# Patient Record
Sex: MALE | Race: WHITE | Employment: FULL TIME | URBAN - METROPOLITAN AREA
[De-identification: names, ages, dates, MRNs, and addresses within clinical notes are randomized per-mention and may not be internally consistent; named-entity substitution may affect disease eponyms.]

---

## 2018-10-02 ENCOUNTER — OFFICE VISIT (OUTPATIENT)
Dept: ORTHOPEDIC SURGERY | Age: 53
End: 2018-10-02

## 2018-10-02 VITALS
DIASTOLIC BLOOD PRESSURE: 83 MMHG | RESPIRATION RATE: 16 BRPM | WEIGHT: 184.4 LBS | OXYGEN SATURATION: 95 % | HEART RATE: 66 BPM | TEMPERATURE: 95.7 F | BODY MASS INDEX: 27.31 KG/M2 | HEIGHT: 69 IN | SYSTOLIC BLOOD PRESSURE: 114 MMHG

## 2018-10-02 DIAGNOSIS — S92.324A CLOSED NONDISPLACED FRACTURE OF SECOND METATARSAL BONE OF RIGHT FOOT, INITIAL ENCOUNTER: ICD-10-CM

## 2018-10-02 DIAGNOSIS — S92.334A CLOSED NONDISPLACED FRACTURE OF THIRD METATARSAL BONE OF RIGHT FOOT, INITIAL ENCOUNTER: ICD-10-CM

## 2018-10-02 DIAGNOSIS — M25.571 ACUTE RIGHT ANKLE PAIN: ICD-10-CM

## 2018-10-02 DIAGNOSIS — M79.671 RIGHT FOOT PAIN: Primary | ICD-10-CM

## 2018-10-02 RX ORDER — IBUPROFEN 800 MG/1
TABLET ORAL
COMMUNITY

## 2018-10-02 RX ORDER — LOSARTAN POTASSIUM AND HYDROCHLOROTHIAZIDE 25; 100 MG/1; MG/1
1 TABLET ORAL DAILY
COMMUNITY

## 2018-10-02 NOTE — MR AVS SNAPSHOT
[address] [place] [address], [address] 200 [facility] 
[phone] Patient: Abraham Rosario MRN: [id number] :1965 Visit Information Date & Time Provider Department Dept. Phone Encounter #  
 10/2/2018  2:00 PM Kasandra Molina, 27 [facility] [phone] [id number] Upcoming Health Maintenance Date Due Pneumococcal 19-64 Medium Risk (1 of 1 - PPSV23) 1986 DTaP/Tdap/Td series (1 - Tdap) 1988 Shingrix Vaccine Age 50> (1 of 2) 2017 FOBT Q 1 YEAR AGE 50-75 2017 Influenza Age 5 to Adult 2018 Allergies as of 10/2/2018  Review Complete On: 10/2/2018 By: Rudy Noel No Known Allergies Current Immunizations  Never Reviewed No immunizations on file. Not reviewed this visit You Were Diagnosed With   
  
 Codes Comments Right foot pain    -  Primary ICD-10-CM: L40.888 ICD-9-CM: 729.5 Acute right ankle pain     ICD-10-CM: M25.571 ICD-9-CM: 719.47, 338.19 Vitals BP Pulse Temp Resp Height(growth percentile) Weight(growth percentile) 114/83 66 95.7 °F (35.4 °C) (Oral) 16 5' 9\" (1.753 m) 184 lb 6.4 oz (83.6 kg) SpO2 BMI Smoking Status 95% 27.23 kg/m2 Current Every Day Smoker Vitals History BMI and BSA Data Body Mass Index Body Surface Area  
 27.23 kg/m 2 2.02 m 2 Your Updated Medication List  
  
   
This list is accurate as of 10/2/18  2:37 PM.  Always use your most recent med list.  
  
  
  
  
 cyclobenzaprine 10 mg tablet Commonly known as:  FLEXERIL Take 10 mg by mouth three (3) times daily as needed for Muscle Spasm(s). ibuprofen 800 mg tablet Commonly known as:  MOTRIN Take  by mouth.  
  
 losartan-hydroCHLOROthiazide 100-25 mg per tablet Commonly known as:  HYZAAR Take 1 Tab by mouth daily. VICODIN PO Take  by mouth daily. We Performed the Following AMB POC XRAY, FOOT; COMPLETE, 3+ VIEW [69081 CPT(R)] POC XRAY, ANKLE; 2 VIEWS [37498 CPT(R)] Patient Instructions Please follow up after CT. You are advised to contact us if your condition worsens. An MRI or CT has been ordered for you. A [employer] will be contacting you to schedule the appointment as soon as it has been approved with your insurance company. Please schedule an appointment to follow up with the doctor or the physicians assistant after the MRI or CT has been conducted. Metatarsal Fracture: Care Instructions Your Care Instructions A metatarsal fracture is a break or a thin, hairline crack in one of the metatarsal bones of the foot. This type of fracture usually happens from repeated stress on the bones of the foot. Or it can happen when a person jumps or changes direction quickly and twists his or her foot or ankle the wrong way. 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 Treatment depends on how bad the fracture is and where the fracture is on the bone. You may or may not have had surgery. Your doctor may have put your foot in a cast or splint to keep it stable. You may have been given crutches to use to keep weight off your foot. A metatarsal fracture may take from 6 weeks to several months to heal. It is important to give your foot time to heal completely, so that you do not hurt it again. Do not return to your usual activities until your doctor says you can. Your doctor may suggest that you get physical therapy to help regain strength and range of motion in your foot. You heal best when you take good care of yourself. Eat a variety of healthy foods, and don't smoke. Follow-up care is a key part of your treatment and safety.  Be sure to make and go to all appointments, and call your doctor if you are having problems. It is also a good idea to know your test results and keep a list of the medicines you take. How can you care for yourself at home? · Be safe with medicines. Read and follow all instructions on the label. ¨ If your doctor gave you a prescription medicine for pain, take it as prescribed. ¨ If you are not taking a prescription pain medicine, ask your doctor if you can take an over-the-counter medicine. · Follow your doctor's instructions about how much weight you can put on your foot and when you can go back to your usual activities. If you were given crutches, use them as directed. · Put ice or a cold pack on your foot for 10 to 20 minutes at a time. Try to do this every 1 to 2 hours for the next 3 days (when you are awake) or until the swelling goes down. Put a thin cloth between the ice and your skin. · Prop up your foot on a pillow when you ice it or anytime you sit or lie down for the next 3 days. Try to keep it above the level of your heart. This will help reduce swelling. Cast and splint care · If your foot is in a cast or splint, follow the cast or splint care instructions your doctor gives you. If you have a removable fiberglass walking cast or a splint, do not take it off unless your doctor tells you to. · Keep your cast or splint dry. If you have a removable fiberglass walking cast or a splint, ask your doctor if it is okay to remove it to bathe. Your doctor may want you to keep it on as much as possible. · If you're told to keep your cast or splint on, tape a sheet of plastic to cover it when you bathe. Or ask your doctor about products that can help keep a cast or splint dry. Water under the cast or splint can cause your skin to itch and hurt. · Never cut your cast or stick anything down it to scratch an itch on your leg. When should you call for help? Call your doctor now or seek immediate medical care if: 
  · You have problems with your cast or splint. For example: ¨ The skin under the cast or splint is burning or stinging. ¨ The cast or splint feels too tight. ¨ There is a lot of swelling near the cast or splint. (Some swelling is normal.) ¨ You have a new fever. ¨ There is drainage or a bad smell coming from the cast or splint.  
  · You have increased or severe pain.  
  · You have tingling, weakness, or numbness in your foot and toes.  
  · You cannot move your toes.  
  · Your foot turns cold or changes color.  
 Watch closely for changes in your health, and be sure to contact your doctor if: 
  · The pain does not get better day by day.  
  · You do not get better as expected. Where can you learn more? Go to http://rafi-chelsy.info/. Enter ([phone] in the search box to learn more about \"Metatarsal Fracture: Care Instructions. \" Current as of: November 29, 2017 Content Version: 11.7 © 6749-0003 [employer]ash. Care instructions adapted under license by [employer] (which disclaims liability or warranty for this information). If you have questions about a medical condition or this instruction, always ask your healthcare professional. [place] [address] any warranty or liability for your use of this information. Introducing \[facility] [place] [facility]\"" & HEALTH SERVICES! [facility] introduces [email or web address] patient portal. Now you can access parts of your medical record, email your doctor's office, and request medication refills online. 1. In your internet browser, go to [email or web address]. [employer] 2. Click on the First Time User? Click Here link in the Sign In box. You will see the New Member Sign Up page. 3. Enter your [email or web address] Access Code exactly as it appears below. You will not need to use this code after youve completed the sign-up process. If you do not sign up before the expiration date, you must request a new code. · [email or web address] Access Code: [email or web address] Expires: 12/31/2018  2:37 PM 
 
 4. Enter the last four digits of your Social Security Number (xxxx) and Date of Birth (mm/dd/yyyy) as indicated and click Submit. You will be taken to the next sign-up page. 5. Create a [employer] ID. This will be your [employer] login ID and cannot be changed, so think of one that is secure and easy to remember. 6. Create a [employer] password. You can change your password at any time. 7. Enter your Password Reset Question and Answer. This can be used at a later time if you forget your password. 8. Enter your e-mail address. You will receive e-mail notification when new information is available in [address] 9. Click Sign Up. You can now view and download portions of your medical record. 10. Click the Download Summary menu link to download a portable copy of your medical information. If you have questions, please visit the Frequently Asked Questions section of the [employer] website. Remember, [employer] is NOT to be used for urgent needs. For medical emergencies, dial 911. Now available from your iPhone and Android! Please provide this summary of care documentation to your next provider. Your primary care clinician is listed as NONE. If you have any questions after today's visit, please call [phone].
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
## 2018-10-02 NOTE — PATIENT INSTRUCTIONS
Please follow up after CT. You are advised to contact us if your condition worsens. An MRI or CT has been ordered for you. A [facility] will be contacting you to schedule the appointment as soon as it has been approved with your insurance company. Please schedule an appointment to follow up with the doctor or the physicians assistant after the MRI or CT has been conducted. Metatarsal Fracture: Care Instructions Your Care Instructions A metatarsal fracture is a break or a thin, hairline crack in one of the metatarsal bones of the foot. This type of fracture usually happens from repeated stress on the bones of the foot. Or it can happen when a person jumps or changes direction quickly and twists his or her foot or ankle the wrong way. 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 Treatment depends on how bad the fracture is and where the fracture is on the bone. You may or may not have had surgery. Your doctor may have put your foot in a cast or splint to keep it stable. You may have been given crutches to use to keep weight off your foot. A metatarsal fracture may take from 6 weeks to several months to heal. It is important to give your foot time to heal completely, so that you do not hurt it again. Do not return to your usual activities until your doctor says you can. Your doctor may suggest that you get physical therapy to help regain strength and range of motion in your foot. You heal best when you take good care of yourself. Eat a variety of healthy foods, and don't smoke. Follow-up care is a key part of your treatment and safety. Be sure to make and go to all appointments, and call your doctor if you are having problems. It is also a good idea to know your test results and keep a list of the medicines you take. How can you care for yourself at home? · Be safe with medicines. Read and follow all instructions on the label. ¨ If your doctor gave you a prescription medicine for pain, take it as prescribed. ¨ If you are not taking a prescription pain medicine, ask your doctor if you can take an over-the-counter medicine. · Follow your doctor's instructions about how much weight you can put on your foot and when you can go back to your usual activities. If you were given crutches, use them as directed. · Put ice or a cold pack on your foot for 10 to 20 minutes at a time. Try to do this every 1 to 2 hours for the next 3 days (when you are awake) or until the swelling goes down. Put a thin cloth between the ice and your skin. · Prop up your foot on a pillow when you ice it or anytime you sit or lie down for the next 3 days. Try to keep it above the level of your heart. This will help reduce swelling. Cast and splint care · If your foot is in a cast or splint, follow the cast or splint care instructions your doctor gives you. If you have a removable fiberglass walking cast or a splint, do not take it off unless your doctor tells you to. · Keep your cast or splint dry. If you have a removable fiberglass walking cast or a splint, ask your doctor if it is okay to remove it to bathe. Your doctor may want you to keep it on as much as possible. · If you're told to keep your cast or splint on, tape a sheet of plastic to cover it when you bathe. Or ask your doctor about products that can help keep a cast or splint dry. Water under the cast or splint can cause your skin to itch and hurt. · Never cut your cast or stick anything down it to scratch an itch on your leg. When should you call for help? Call your doctor now or seek immediate medical care if: 
  · You have problems with your cast or splint. For example: ¨ The skin under the cast or splint is burning or stinging. ¨ The cast or splint feels too tight. ¨ There is a lot of swelling near the cast or splint. (Some swelling is normal.) ¨ You have a new fever. ¨ There is drainage or a bad smell coming from the cast or splint.  
  · You have increased or severe pain.  
  · You have tingling, weakness, or numbness in your foot and toes.  
  · You cannot move your toes.  
  · Your foot turns cold or changes color.  
 Watch closely for changes in your health, and be sure to contact your doctor if: 
  · The pain does not get better day by day.  
  · You do not get better as expected. Where can you learn more? Go to http://rafi-chelsy.info/. Enter ([phone] in the search box to learn more about \"Metatarsal Fracture: Care Instructions. \" Current as of: November 29, 2017 Content Version: 11.7 © 4374-4432 [employer]. Care instructions adapted under license by [employer] (which disclaims liability or warranty for this information). If you have questions about a medical condition or this instruction, always ask your healthcare professional. [address] any warranty or liability for your use of this information.

## 2018-10-02 NOTE — PROCEDURES
RADIOGRAPHIC INTERPRETATION    The impression for this/these radiograph(s) will be found in the office visit progress note for this encounter from 10/2/2018.     César Diaz MD  10/2/2018  2:19 PM

## 2018-10-12 ENCOUNTER — HOSPITAL ENCOUNTER (OUTPATIENT)
Dept: CT IMAGING | Age: 53
Discharge: HOME | End: 2018-10-12
Attending: ORTHOPAEDIC SURGERY
Payer: COMMERCIAL

## 2018-10-12 DIAGNOSIS — M25.571 ACUTE RIGHT ANKLE PAIN: ICD-10-CM

## 2018-10-12 DIAGNOSIS — M79.671 RIGHT FOOT PAIN: ICD-10-CM

## 2018-10-12 PROCEDURE — 73700 CT LOWER EXTREMITY W/O DYE: CPT

## 2018-10-29 ENCOUNTER — OFFICE VISIT (OUTPATIENT)
Dept: ORTHOPEDIC SURGERY | Age: 53
End: 2018-10-29

## 2018-10-29 VITALS
DIASTOLIC BLOOD PRESSURE: 93 MMHG | HEART RATE: 94 BPM | WEIGHT: 183 LBS | RESPIRATION RATE: 16 BRPM | OXYGEN SATURATION: 97 % | TEMPERATURE: 98 F | BODY MASS INDEX: 27.11 KG/M2 | SYSTOLIC BLOOD PRESSURE: 136 MMHG | HEIGHT: 69 IN

## 2018-10-29 DIAGNOSIS — S92.344D CLOSED NONDISPLACED FRACTURE OF FOURTH METATARSAL BONE OF RIGHT FOOT WITH ROUTINE HEALING: ICD-10-CM

## 2018-10-29 DIAGNOSIS — S92.334D CLOSED NONDISPLACED FRACTURE OF THIRD METATARSAL BONE OF RIGHT FOOT WITH ROUTINE HEALING, SUBSEQUENT ENCOUNTER: Primary | ICD-10-CM

## 2018-10-29 DIAGNOSIS — S92.354D CLOSED NONDISPLACED FRACTURE OF FIFTH METATARSAL BONE OF RIGHT FOOT WITH ROUTINE HEALING: ICD-10-CM

## 2018-10-29 DIAGNOSIS — S92.324D CLOSED NONDISPLACED FRACTURE OF SECOND METATARSAL BONE OF RIGHT FOOT WITH ROUTINE HEALING, SUBSEQUENT ENCOUNTER: ICD-10-CM

## 2018-10-29 RX ORDER — ACETAMINOPHEN AND CODEINE PHOSPHATE 300; 30 MG/1; MG/1
2 TABLET ORAL
Qty: 30 TAB | Refills: 0 | Status: SHIPPED | OUTPATIENT
Start: 2018-10-29

## 2018-10-29 NOTE — LETTER
NOTIFICATION RETURN TO WORK / SCHOOL 
 
10/29/2018 9:56 AM 
 
Mr. Liana Le 85 [address] [facility] [address] To Whom It May Concern: 
 
Liana Rutledge is currently under the care of [facility] Travon Gifford Corby Sahni. Please allow Mr. Garcia Harley to use his discretion to remain seated as needed due to multiple metatarsal fractures of his right foot. Please make accomdations for him, including providing a chair and assistance from colleagues. If there are questions or concerns please have the patient contact our office.  
 
 
 
Sincerely, 
 
 
Eric Arnold MD

## 2018-10-29 NOTE — PROGRESS NOTES
AMBULATORY PROGRESS NOTE Patient: Leona Casarez             MRN: [id number]     SSN: [id number] Body mass index is 27.02 kg/m². YOB: 1965     AGE: 50 y.o. EX: male PCP: Elicia Leonardo NP IMPRESSION/DIAGNOSIS AND TREATMENT PLAN  
 
DIAGNOSES 1. Closed nondisplaced fracture of third metatarsal bone of right foot with routine healing, subsequent encounter 2. Closed nondisplaced fracture of second metatarsal bone of right foot with routine healing, subsequent encounter 3. Closed nondisplaced fracture of fourth metatarsal bone of right foot with routine healing 4. Closed nondisplaced fracture of fifth metatarsal bone of right foot with routine healing Orders Placed This Encounter  [65847] Foot Min 3V  acetaminophen-codeine (TYLENOL-CODEINE #3) 300-30 mg per tablet Leona Casarez understands his diagnoses and the proposed plan. As such, in this individual, he has additional metatarsal fractures of the third and of the fourth that are seen on the CT scan. The CT scan is listed as below. When I saw him, I knew he had a second and third, but we ordered a CT scan to give him a more complete picture. He had x-rays today in my office, three views, as he has been weightbearing on this foot. He discontinued the CAM walker boot because he states it is too uncomfortable, so he has been wearing tennis shoes. He does work at [employer]. He sits primarily, but he is still at work. So, the CT scan showed fractures to the second, third, fourth, and fifth metatarsals, non-displaced and non-angulated. X-rays of his foot today, nonweightbearing, show an extraarticular fracture to the second metatarsal base and third metatarsal base and a hairline fracture to the fourth metatarsal base. The fifth metatarsal fracture is hard to see on this plain x-ray, but it is present on the CT scan. The plan is listed as below. I want to reassess him in three weeks. X-rays upon next visit. He arrived here without his CAM walker boot. Plan: 
 
1) Work Note: Please allow Mr. Nhan Keller to use his discretion to remain seated as needed due to his multiple metatarsal fractures of his right foot. Please make accomdations for him, including providing a chair and assistance from colleagues. 2) Tylenol #3: 2 PO TIDPRN; 30 tablets, 0 refills. 3) Continue activity modification as directed. RTO - 3 weeks and PLEASE OBTAIN X-RAYS OF: left foot 3 VIEWS  
 HPI AND EXAMINATION Chi Whitney IS A 50 y.o. male who presents to my outpatient office for follow up of a closed nondisplaced fracture of the second and third metatarsal of the right foot. At last visit, I ordered a CT scan of the right foot, instructed the patient to continue using the CAM walker boot as directed, and to continue activity modification as directed. Date of injury: 9/22/2018. Since we saw him last, Mr. Nhan Keller states that he is still experiencing pain from his multiple metatarsal fractures. He reports that he was taking Vicodin as prescribed by his PCP, but that he is currently not taking any pain medication. He notes that the Vicodin did not help much, and states that his neighbor mentioned he should be taking Codeine. At this time, he will receive a prescription for Tylenol #3 and will continue activity modification as directed. 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 Appearance: Alert, well appearing and pleasant patient who is in no distress, oriented to person, place/time, and who follows commands. This patient is accompanied in the examination room by his  self. no dementia Psychiatric: Affect and mood are appropriate. Patient arrives to office via: without assistive device:  Cam boot (a friends)// no crutch 
H EENT (2): Head normocephalic & atraumatic. Both pupils are round, non icteric sclera Eye: EOM are intact and sclera are clear Neck: ROM WNL Hearings Intact, does not require hearing aid device Respiratory: Breathing is unlabored without accessory chest muscle use Cardiovascular/Peripheral Vascular: Normal Pulses to each  foot ANKLE/FOOT right 
  
Psychiatry: Alert, Oriented x 3; Speech normal in context and clarity,  
                    Memory intact grossly, no involuntary movements - tremors, no dementia Gait: Normal 
Tenderness: Mild tenderness to the posterior fibula along peroneal tendons Mild tenderness to the lateral ankle Moderate tenderness to the #2 metatarsal.  
Cutaneous: Mild to moderate swelling of the lateral ankle. Joint Motion: Not tested at this time due to pain. Joint / Tendon Stability: No Ankle or Subtalar instability or joint laxity. No peroneal sublux ability or dislocation Alignment: Forefoot, Midfoot, Hindfoot WNL. Neuro Motor/Sensory: NL/NL. Vascular: NL foot/ankle pulses. Lymphatics: No extremity lymphedema, No calf swelling, no tenderness to calf muscles. CHART REVIEW No past medical history on file. Current Outpatient Medications Medication Sig  
 acetaminophen-codeine (TYLENOL-CODEINE #3) 300-30 mg per tablet Take 2 Tabs by mouth three (3) times daily as needed for Pain. Max Daily Amount: 6 Tabs.  losartan-hydroCHLOROthiazide (HYZAAR) 100-25 mg per tablet Take 1 Tab by mouth daily.  cyclobenzaprine (FLEXERIL) 10 mg tablet Take 10 mg by mouth three (3) times daily as needed for Muscle Spasm(s).  ibuprofen (MOTRIN) 800 mg tablet Take  by mouth. No current facility-administered medications for this visit. No Known Allergies Past Surgical History:  
Procedure Laterality Date  HX ORTHOPAEDIC Social History Occupational History  Not on file Tobacco Use  
  Smoking status: Current Every Day Smoker Packs/day: 1.00 Substance and Sexual Activity  Alcohol use: Yes Comment: daily  Drug use: No  
 Sexual activity: Not on file No family history on file. REVIEW OF SYSTEMS : 10/29/2018  ALL BELOW ARE Negative except : SEE HPI Constitutional: Negative for fever, chills and weight loss. Neg Weight Loss Cardiovascular: Negative for chest pain, claudication and leg swelling. SOB, ANTHONY Gastrointestinal/Urological: Negative for  pain, N/V/D/C, Blood in stool or urine,dysuria                         Hematuria, Incontinence, pelvic pain Musculoskeletal: see HPI. Neurological: Negative for dizziness and weakness, headaches,Visual Changes             Confusion,  Or Seizures, Psychiatric/Behavioral: Negative for depression, memory loss and substance abuse. Extremities:  Negative for hair changes, rash or skin lesion changes. Hematologic: Negative for Bleeding problems, bruising, pallor or swollen lymph nodes. Peripheral Vascular: No calf pain, vascular vein tenderness to calf pain No calf throbbing, posterior knee throbbing pain DIAGNOSTIC IMAGING No notes on file CT Results (most recent): 
Results from Hospital Encounter encounter on 10/12/18 CT FOOT RT WO CONT Narrative CT of right lower extremity without contrast 
 
CPT CODE: 72655 HISTORY: Acute right ankle and foot pain. Metatarsal fracture evaluation. COMPARISON: None. TECHNIQUE: Contiguous thin axial images to the right foot are performed from the 
level of ankle to the level of the toes without IV contrast administration. Computer coronal and sagittal reconstruction images are performed for better 
evaluation of the bony structures in relation to the adjacent vasculature and 
soft tissue structures and reduce the radiation dose. 3D postprocessed images, 
including surface shaded display, also produce for this exam, permanently archived, and interpreted. All CT scans at this facility performed using dose optimization techniques as 
appreciated to a performed exam, to include automated exposure control, 
adjustment of the mA and or KU according to patient size (including appropriate 
matching for site specific examination), or use of iterative reconstruction 
technique. FINDINGS:  
 
There are subtle oblique/transverse lucent lines identified at the bases of the 
second through fourth metatarsals, suggesting nondisplaced fractures. There is 
also a oblique/vertical fracture line identified in the fifth metatarsal. The 
remaining bony structures in right foot appear intact. No joint space 
abnormality or dislocation identified. The imaged distal tibia and fibula appear 
intact. Mild edema of the foot noted. No significant ankle edema seen. Impression IMPRESSION: 
 
1. Subtle nondisplaced fractures across the bases of second through fourth 
metatarsals and nondisplaced oblique fracture of fifth metatarsal identified. 2. Mild foot edema. Thank you for your referral.   
 
Please see above section of this report. I have personally reviewed the results of the above study. The interpretation of this study is my professional opinion. Written by Halley Daily, as dictated by Dr. Zoya Hansen. I, Dr. Zoya Hansen, confirm that all documentation is accurate.

## 2018-10-29 NOTE — PATIENT INSTRUCTIONS
Broken Foot: Care Instructions Your Care Instructions A broken foot, or foot fracture, is a break in one or more of the bones in your foot. It may happen because of a sports injury, a fall, or other accident. A compound, or open, fracture occurs when a bone breaks through the skin. A break that does not poke through the skin is a closed fracture. Your treatment depends on the location and type of break in your foot. You may need a splint, a cast, or an orthopedic shoe. Certain kinds of injuries may need surgery at some time. Whatever your treatment, you can ease symptoms and help your foot heal with care at home. You may need 6 to 8 weeks or more to fully heal. 
You heal best when you take good care of yourself. Eat a variety of healthy foods, and don't smoke. Follow-up care is a key part of your treatment and safety. Be sure to make and go to all appointments, and call your doctor if you are having problems. It's also a good idea to know your test results and keep a list of the medicines you take. How can you care for yourself at home? · Be safe with medicines. Take pain medicines exactly as directed. ? If the doctor gave you a prescription medicine for pain, take it as prescribed. ? If you are not taking a prescription pain medicine, ask your doctor if you can take an over-the-counter medicine. · Leave the splint on until your follow-up appointment. Do not put any weight on the injured foot. If you were given crutches, use them as directed. · Put ice or a cold pack on your foot for 10 to 20 minutes at a time. Try to do this every 1 to 2 hours for the next 3 days (when you are awake) or until the swelling goes down. Put a thin cloth between the ice and your skin. · Prop up the sore foot on a pillow anytime you sit or lie down during the next 3 days. Try to keep it above the level of your heart. This will help reduce swelling. · Follow the cast care instructions your doctor gives you. If you have a splint, do not take it off unless your doctor tells you to. Cast and splint care · If you have a removable splint, ask your doctor if it is okay to remove it to bathe. Your doctor may want you to keep it on as much as possible. · Keep your plaster splint covered by taping a sheet of plastic around it when you bathe. Water under the plaster can cause your skin to itch and hurt. · Never cut your splint. When should you call for help? Call 911 anytime you think you may need emergency care. For example, call if: 
  · You have chest pain, are short of breath, or you cough up blood.  
 Call your doctor now or seek immediate medical care if: 
  · You have new or worse pain.  
  · Your foot is cool or pale or changes color.  
  · You have tingling, weakness, or numbness in your toes.  
  · Your cast or splint feels too tight.  
  · You have signs of a blood clot in your leg (called a deep vein thrombosis), such as: 
? Pain in your calf, back of the knee, thigh, or groin. ? Redness or swelling in your leg.  
 Watch closely for changes in your health, and be sure to contact your doctor if: 
  · You have a problem with your splint or cast.  
  · You do not get better as expected. Where can you learn more? Go to http://rafi-chelsy.info/. Enter L725 in the search box to learn more about \"Broken Foot: Care Instructions. \" Current as of: November 29, 2017 Content Version: 11.8 © 8869-7871 [employer]. Care instructions adapted under license by [employer] (which disclaims liability or warranty for this information). If you have questions about a medical condition or this instruction, always ask your healthcare professional. [address] any warranty or liability for your use of this information.

## 2018-11-19 ENCOUNTER — OFFICE VISIT (OUTPATIENT)
Dept: ORTHOPEDIC SURGERY | Age: 53
End: 2018-11-19

## 2018-11-19 VITALS
OXYGEN SATURATION: 99 % | HEIGHT: 69 IN | BODY MASS INDEX: 27.46 KG/M2 | WEIGHT: 185.4 LBS | TEMPERATURE: 97.8 F | SYSTOLIC BLOOD PRESSURE: 128 MMHG | HEART RATE: 75 BPM | RESPIRATION RATE: 16 BRPM | DIASTOLIC BLOOD PRESSURE: 92 MMHG

## 2018-11-19 DIAGNOSIS — S92.344D CLOSED NONDISPLACED FRACTURE OF FOURTH METATARSAL BONE OF RIGHT FOOT WITH ROUTINE HEALING: ICD-10-CM

## 2018-11-19 DIAGNOSIS — S92.354D CLOSED NONDISPLACED FRACTURE OF FIFTH METATARSAL BONE OF RIGHT FOOT WITH ROUTINE HEALING: ICD-10-CM

## 2018-11-19 DIAGNOSIS — M79.671 RIGHT FOOT PAIN: Primary | ICD-10-CM

## 2018-11-19 DIAGNOSIS — S92.334D CLOSED NONDISPLACED FRACTURE OF THIRD METATARSAL BONE OF RIGHT FOOT WITH ROUTINE HEALING, SUBSEQUENT ENCOUNTER: ICD-10-CM

## 2018-11-19 DIAGNOSIS — S92.324D CLOSED NONDISPLACED FRACTURE OF SECOND METATARSAL BONE OF RIGHT FOOT WITH ROUTINE HEALING, SUBSEQUENT ENCOUNTER: ICD-10-CM

## 2018-11-19 NOTE — LETTER
NOTIFICATION RETURN TO WORK / SCHOOL 
 
11/19/2018 9:38 AM 
 
Mr. Blair Le 85 [id number] [facility] [address] To Whom It May Concern: 
 
Blair Barrios is currently under the care of [address] [place] [address] Corby Sahni. Please allow Mr. Nguyen Barr to use his discretion to remain seated as needed due to his multiple metatarsal fractures of his right foot. Please make accomdations for him, including providing a chair and assistance from colleagues. If there are questions or concerns please have the patient contact our office.  
 
 
 
Sincerely, 
 
 
Jovan Dacosta MD

## 2018-11-19 NOTE — PROGRESS NOTES
AMBULATORY PROGRESS NOTE Patient: Leona Casarez             MRN: [id number]     SSN: [id number] Body mass index is 27.38 kg/m². YOB: 1965     AGE: 48 y.o. EX: male PCP: Elicia Leonardo NP IMPRESSION/DIAGNOSIS AND TREATMENT PLAN  
 
DIAGNOSES 1. Right foot pain 2. Closed nondisplaced fracture of third metatarsal bone of right foot with routine healing, subsequent encounter 3. Closed nondisplaced fracture of second metatarsal bone of right foot with routine healing, subsequent encounter 4. Closed nondisplaced fracture of fourth metatarsal bone of right foot with routine healing 5. Closed nondisplaced fracture of fifth metatarsal bone of right foot with routine healing Orders Placed This Encounter  [53025] Foot Min 3V Leona Casarez understands his diagnoses and the proposed plan. DIAGNOSTIC STUDIES:  X-rays of his right foot, three views, today, shows healed second, third, and fourth metatarsal fractures, normal alignment. No osteolytic or osteoblastic lesions are seen. I can barely see the fourth metatarsal.  That looks healed. The third metatarsal has a slight, slight linear line. The second metatarsal is still healing. So, my overall impression, really, is continued healing of the multiple metatarsal fractures of the midfoot. Recommendation is to continue wearing supportive shoes. Additional plants are listed as below. Plan: 
 
1) Continue wearing comfortable shoes while working. 2) Continue activity modification as directed. 3) Work Note: Please allow Mr. Rory Sanchez to use his discretion to remain seated as needed due to his multiple metatarsal fractures of his right foot. Please make accomdations for him, including providing a chair and assistance from colleagues. RTO - 4 weeks // PLEASE OBTAIN X-RAYS OF: left foot 3 VIEWS   
 HPI AND EXAMINATION Leona Casarez IS A 48 y.o. male who presents to my outpatient office for follow up of a closed nondisplaced fracture of the second, third, fourth, and fifth metatarsal of the right foot. At the last visit, I provided a work note, prescribed Tylenol #3, and instructed the patient to continue activity modification as directed. Date of injury: 9/22/2018. Since we saw him last, Mr. Yuki Langford states that he is doing well and is not experiencing any major pain or soreness. He has returned to work and has had no major difficulties with this. He does note that he finds himself limping occasionally. XR imaging has been reviewed with the patient. The patient works at a [employer] facility.  
 
Visit Vitals BP (!) 128/92 Pulse 75 Temp 97.8 °F (36.6 °C) (Oral) Resp 16 Ht 5' 9\" (1.753 m) Wt 185 lb 6.4 oz (84.1 kg) SpO2 99% BMI 27.38 kg/m² Appearance: Alert, well appearing and pleasant patient who is in no distress, oriented to person, place/time, and who follows commands. This patient is accompanied in the examination room by his  self. no dementia Psychiatric: Affect and mood are appropriate. Patient arrives to office via: without assistive device:  Cam boot (a friends)// no crutch 
H EENT (2): Head normocephalic & atraumatic. Both pupils are round, non icteric sclera             Eye: EOM are intact and sclera are clear  
            Neck: ROM WNL   
            Hearings Intact, does not require hearing aid device Respiratory: Breathing is unlabored without accessory chest muscle use Cardiovascular/Peripheral Vascular: Normal Pulses to each  foot 
  ANKLE/FOOT right 
  
Psychiatry: Alert, Oriented x 3; Speech normal in context and clarity,  
                    Memory intact grossly, no involuntary movements - tremors, no dementia Gait: Normal 
Tenderness: Mild tenderness to the #2 and #3 metatarsal base. NT to stressing of the midfoot. Cutaneous: Mild swelling of the lateral ankle.  
Joint Motion: Not tested at this time due to pain. Joint / Tendon Stability: No Ankle or Subtalar instability or joint laxity.                                           No peroneal sublux ability or dislocation Alignment: Forefoot, Midfoot, Hindfoot WNL. Neuro Motor/Sensory: NL/NL. Vascular: NL foot/ankle pulses. Lymphatics: No extremity lymphedema, No calf swelling, no tenderness to calf muscles. CHART REVIEW No past medical history on file. Current Outpatient Medications Medication Sig  
 losartan-hydroCHLOROthiazide (HYZAAR) 100-25 mg per tablet Take 1 Tab by mouth daily.  acetaminophen-codeine (TYLENOL-CODEINE #3) 300-30 mg per tablet Take 2 Tabs by mouth three (3) times daily as needed for Pain. Max Daily Amount: 6 Tabs.  ibuprofen (MOTRIN) 800 mg tablet Take  by mouth.  cyclobenzaprine (FLEXERIL) 10 mg tablet Take 10 mg by mouth three (3) times daily as needed for Muscle Spasm(s). No current facility-administered medications for this visit. No Known Allergies Past Surgical History:  
Procedure Laterality Date  HX ORTHOPAEDIC Social History Occupational History  Not on file Tobacco Use  Smoking status: Current Every Day Smoker Packs/day: 1.00 Substance and Sexual Activity  Alcohol use: Yes Comment: daily  Drug use: No  
 Sexual activity: Not on file No family history on file. REVIEW OF SYSTEMS : 11/19/2018  ALL BELOW ARE Negative except : SEE HPI Constitutional: Negative for fever, chills and weight loss. Neg Weight Loss Cardiovascular: Negative for chest pain, claudication and leg swelling. SOB, ANTHONY Gastrointestinal/Urological: Negative for  pain, N/V/D/C, Blood in stool or urine,dysuria                         Hematuria, Incontinence, pelvic pain Musculoskeletal: see HPI. Neurological: Negative for dizziness and weakness, headaches,Visual Changes             Confusion,  Or Seizures, Psychiatric/Behavioral: Negative for depression, memory loss and substance abuse. Extremities:  Negative for hair changes, rash or skin lesion changes. Hematologic: Negative for Bleeding problems, bruising, pallor or swollen lymph nodes. Peripheral Vascular: No calf pain, vascular vein tenderness to calf pain No calf throbbing, posterior knee throbbing pain DIAGNOSTIC IMAGING No notes on file Please see above section of this report. I have personally reviewed the results of the above study. The interpretation of this study is my professional opinion. Written by Vickie Lentz, as dictated by Dr. Val Davis. I, Dr. Val Davis, confirm that all documentation is accurate.

## 2018-12-17 ENCOUNTER — OFFICE VISIT (OUTPATIENT)
Dept: ORTHOPEDIC SURGERY | Age: 53
End: 2018-12-17

## 2018-12-17 VITALS
OXYGEN SATURATION: 97 % | HEART RATE: 68 BPM | SYSTOLIC BLOOD PRESSURE: 144 MMHG | TEMPERATURE: 98.3 F | WEIGHT: 193 LBS | BODY MASS INDEX: 28.58 KG/M2 | RESPIRATION RATE: 16 BRPM | HEIGHT: 69 IN | DIASTOLIC BLOOD PRESSURE: 96 MMHG

## 2018-12-17 DIAGNOSIS — S92.324D CLOSED NONDISPLACED FRACTURE OF SECOND METATARSAL BONE OF RIGHT FOOT WITH ROUTINE HEALING, SUBSEQUENT ENCOUNTER: ICD-10-CM

## 2018-12-17 DIAGNOSIS — S92.334D CLOSED NONDISPLACED FRACTURE OF THIRD METATARSAL BONE OF RIGHT FOOT WITH ROUTINE HEALING, SUBSEQUENT ENCOUNTER: ICD-10-CM

## 2018-12-17 DIAGNOSIS — M79.672 LEFT FOOT PAIN: Primary | ICD-10-CM

## 2018-12-17 DIAGNOSIS — M79.671 RIGHT FOOT PAIN: ICD-10-CM

## 2018-12-17 DIAGNOSIS — S92.344D CLOSED NONDISPLACED FRACTURE OF FOURTH METATARSAL BONE OF RIGHT FOOT WITH ROUTINE HEALING: ICD-10-CM

## 2018-12-17 NOTE — LETTER
NOTIFICATION RETURN TO WORK / SCHOOL 
 
12/17/2018 9:55 AM 
 
Mr. Marivel Le 85 [phone] [facility] [address] To Whom It May Concern: 
 
Marivel Callahan is currently under the care of [address] [place] [address] Corby Sahni. Please allow Mr. Bart Mckinney to return to work full duty. If there are questions or concerns please have the patient contact our office.  
 
 
 
Sincerely, 
 
 
Mary Jo Merino MD

## 2018-12-17 NOTE — PROGRESS NOTES
AMBULATORY PROGRESS NOTE      Patient: Damaris Duncan             MRN: [id number]     SSN: [id number] Body mass index is 28.5 kg/m². YOB: 1965     AGE: 48 y.o. EX: male    PCP: Shari Vega NP     IMPRESSION/DIAGNOSIS AND TREATMENT PLAN     DIAGNOSES  1. Left foot pain    2. Closed nondisplaced fracture of second metatarsal bone of right foot with routine healing, subsequent encounter    3. Closed nondisplaced fracture of fourth metatarsal bone of right foot with routine healing    4. Closed nondisplaced fracture of third metatarsal bone of right foot with routine healing, subsequent encounter    5. Right foot pain        Orders Placed This Encounter    [00214] Foot Min 3V      Damaris Duncan understands his diagnoses and the proposed plan. In this individual who is pain-free and admits to having no pain in his foot and has admitted to me that he has returned to work full duty the last three weeks even without my note, he says he is having no pain. He is doing well. We are going to see him on an as-needed basis. He had x-rays today that show healed, right, second and third metatarsal basilar fractures with intact TMT joints without instability. On examination, he is absolutely nontender. He has a negative piano key test and negative discomfort when I stress his midfoot while holding his hindfoot fixed. Plan:    1) Work Note: Please allow Mr. Tanesha Madrigal to return to work full duty. 2) Continue activity as tolerated. RTO - PRN     HPI AND EXAMINATION     Damaris Duncan IS A 48 y.o. male who presents to my outpatient office for follow up of a closed nondisplaced fracture of the second, third, fourth, and fifth metatarsal of the right foot. At the last visit, I provided a work note, instructed the patient to continue activity modification as directed, and to continue wearing comfortable shoes as directed.  Date of injury: 9/22/2018.     Since we saw him last, Mr. Tanesha Madrigal states that he is doing well and is not experiencing any major pain or soreness. He reports that he has no pain when he walks around the home barefoot. Additionally, he notes that he has returned to work full duty. XR imaging has been reviewed with the patient. The patient works at a [email or web address]  BP (!) 144/96   Pulse 68   Temp 98.3 °F (36.8 °C) (Oral)   Resp 16   Ht 5' 9\" (1.753 m)   Wt 193 lb (87.5 kg)   SpO2 97%   BMI 28.50 kg/m²      Appearance: Alert, well appearing and pleasant patient who is in no distress, oriented to person, place/time, and who follows commands. This patient is accompanied in the examination room by his  self. no dementia  Psychiatric: Affect and mood are appropriate. Patient arrives to office via: without assistive device:  Cam boot (a friends)// no crutch  H EENT (2): Head normocephalic & atraumatic. Both pupils are round, non icteric sclera              Eye: EOM are intact and sclera are clear               Neck: ROM WNL                Hearings Intact, does not require hearing aid device  Respiratory: Breathing is unlabored without accessory chest muscle use  Cardiovascular/Peripheral Vascular: Normal Pulses to each  foot     ANKLE/FOOT right     Psychiatry: Alert, Oriented x 3; Speech normal in context and clarity,                       Memory intact grossly, no involuntary movements - tremors, no dementia  Gait: Normal  Tenderness: No tenderness to the #2 and #3 metatarsal base. NT to stressing of the midfoot. Cutaneous: No significant swelling in midfoot at this time. Joint Motion: WNL  Joint / Tendon Stability: No Ankle or Subtalar instability or joint laxity.                                           No peroneal sublux ability or dislocation  Alignment: Forefoot, Midfoot, Hindfoot WNL. Neuro Motor/Sensory: NL/NL. Vascular: NL foot/ankle pulses.   Lymphatics: No extremity lymphedema, No calf swelling, no tenderness to calf muscles. CHART REVIEW     No past medical history on file. Current Outpatient Medications   Medication Sig    losartan-hydroCHLOROthiazide (HYZAAR) 100-25 mg per tablet Take 1 Tab by mouth daily.  ibuprofen (MOTRIN) 800 mg tablet Take  by mouth.  cyclobenzaprine (FLEXERIL) 10 mg tablet Take 10 mg by mouth three (3) times daily as needed for Muscle Spasm(s).  acetaminophen-codeine (TYLENOL-CODEINE #3) 300-30 mg per tablet Take 2 Tabs by mouth three (3) times daily as needed for Pain. Max Daily Amount: 6 Tabs. No current facility-administered medications for this visit. No Known Allergies  Past Surgical History:   Procedure Laterality Date    HX ORTHOPAEDIC       Social History     Occupational History    Not on file   Tobacco Use    Smoking status: Current Every Day Smoker     Packs/day: 1.00    Smokeless tobacco: Never Used   Substance and Sexual Activity    Alcohol use: Yes     Comment: daily    Drug use: No    Sexual activity: Not on file     No family history on file. REVIEW OF SYSTEMS : 12/17/2018  ALL BELOW ARE Negative except : SEE HPI     Constitutional: Negative for fever, chills and weight loss. Neg Weight Loss  Cardiovascular: Negative for chest pain, claudication and leg swelling. SOB, ANTHONY   Gastrointestinal/Urological: Negative for  pain, N/V/D/C, Blood in stool or urine,dysuria                         Hematuria, Incontinence, pelvic pain  Musculoskeletal: see HPI. Neurological: Negative for dizziness and weakness, headaches,Visual Changes             Confusion,  Or Seizures,   Psychiatric/Behavioral: Negative for depression, memory loss and substance abuse. Extremities:  Negative for hair changes, rash or skin lesion changes. Hematologic: Negative for Bleeding problems, bruising, pallor or swollen lymph nodes.   Peripheral Vascular: No calf pain, vascular vein tenderness to calf pain              No calf throbbing, posterior knee throbbing pain     DIAGNOSTIC IMAGING No notes on file    Please see above section of this report. I have personally reviewed the results of the above study. The interpretation of this study is my professional opinion. Written by Sanna Borja, as dictated by Dr. Christiano Traylor. I, Dr. Christiano Traylor, confirm that all documentation is accurate.

## 2018-12-17 NOTE — PATIENT INSTRUCTIONS
Please follow up as needed. You are advised to contact us if your condition worsens. Broken Foot: Care Instructions  Your Care Instructions    A broken foot, or foot fracture, is a break in one or more of the bones in your foot. It may happen because of a sports injury, a fall, or other accident. A compound, or open, fracture occurs when a bone breaks through the skin. A break that does not poke through the skin is a closed fracture. Your treatment depends on the location and type of break in your foot. You may need a splint, a cast, or an orthopedic shoe. Certain kinds of injuries may need surgery at some time. Whatever your treatment, you can ease symptoms and help your foot heal with care at home. You may need 6 to 8 weeks or more to fully heal.  You heal best when you take good care of yourself. Eat a variety of healthy foods, and don't smoke. Follow-up care is a key part of your treatment and safety. Be sure to make and go to all appointments, and call your doctor if you are having problems. It's also a good idea to know your test results and keep a list of the medicines you take. How can you care for yourself at home? · Be safe with medicines. Take pain medicines exactly as directed. ? If the doctor gave you a prescription medicine for pain, take it as prescribed. ? If you are not taking a prescription pain medicine, ask your doctor if you can take an over-the-counter medicine. · Leave the splint on until your follow-up appointment. Do not put any weight on the injured foot. If you were given crutches, use them as directed. · Put ice or a cold pack on your foot for 10 to 20 minutes at a time. Try to do this every 1 to 2 hours for the next 3 days (when you are awake) or until the swelling goes down. Put a thin cloth between the ice and your skin. · Prop up the sore foot on a pillow anytime you sit or lie down during the next 3 days. Try to keep it above the level of your heart.  This will help reduce swelling. · Follow the cast care instructions your doctor gives you. If you have a splint, do not take it off unless your doctor tells you to. Cast and splint care  · If you have a removable splint, ask your doctor if it is okay to remove it to bathe. Your doctor may want you to keep it on as much as possible. · Keep your plaster splint covered by taping a sheet of plastic around it when you bathe. Water under the plaster can cause your skin to itch and hurt. · Never cut your splint. When should you call for help? Call 911 anytime you think you may need emergency care. For example, call if:    · You have chest pain, are short of breath, or you cough up blood.    Call your doctor now or seek immediate medical care if:    · You have new or worse pain.     · Your foot is cool or pale or changes color.     · You have tingling, weakness, or numbness in your toes.     · Your cast or splint feels too tight.     · You have signs of a blood clot in your leg (called a deep vein thrombosis), such as:  ? Pain in your calf, back of the knee, thigh, or groin. ? Redness or swelling in your leg.    Watch closely for changes in your health, and be sure to contact your doctor if:    · You have a problem with your splint or cast.     · You do not get better as expected. Where can you learn more? Go to http://rafi-chelsy.info/. Enter N183 in the search box to learn more about \"Broken Foot: Care Instructions. \"  Current as of: November 29, 2017  Content Version: 11.8  © 1979-4710 [employer]. Care instructions adapted under license by [employer] (which disclaims liability or warranty for this information). If you have questions about a medical condition or this instruction, always ask your healthcare professional. [address] any warranty or liability for your use of this information.

## 2022-02-14 NOTE — PATIENT INSTRUCTIONS
Please follow up in 1 month. You are advised to contact us if your condition worsens. Fifth Metatarsal Oneal Fracture: Care Instructions Your Care Instructions A fifth metatarsal fracture is a break or a thin, hairline crack in the long bone on the outside of the foot. A Oneal fracture occurs near the end of this bone that is closest to the ankle. This type of fracture can happen when a person jumps or changes direction quickly and twists the foot or ankle the wrong way. Or it can happen from repeated stress on the bones of the foot. Treatment depends on how bad the fracture is. You may or may not have had surgery. Your doctor may have put your foot in a cast to keep it stable. A splint may be used in some cases if there is a lot of swelling. You may have been given crutches to use to keep weight off your foot. Your doctor may recommend that you keep weight off the foot for several weeks. The fracture may take 6 weeks to several months to heal. It is important to give your foot time to heal completely so that you don't hurt it again. Do not return to your usual activities until your doctor says you can. Your doctor may suggest that you get physical therapy to help regain strength and range of motion in your foot. You heal best when you take good care of yourself. Eat a variety of healthy foods, and don't smoke. Follow-up care is a key part of your treatment and safety. Be sure to make and go to all appointments, and call your doctor if you are having problems. It's also a good idea to know your test results and keep a list of the medicines you take. How can you care for yourself at home? · Be safe with medicines. Read and follow all instructions on the label. ? If the doctor gave you a prescription medicine for pain, take it as prescribed. ? If you are not taking a prescription pain medicine, ask your doctor if you can take an over-the-counter medicine. · Follow your doctor's instructions about how much weight you can put on your foot and when you can go back to your usual activities. If you were given crutches, use them as directed. · Put ice or a cold pack on your foot for 10 to 20 minutes at a time. Try to do this every 1 to 2 hours for the next 3 days (when you are awake) or until the swelling goes down. Put a thin cloth between the ice and your skin. · Prop up your foot on a pillow when you ice it or anytime you sit or lie down for the next 3 days. Try to keep it above the level of your heart. This will help reduce swelling. Cast and splint care · If your foot is in a cast or splint, follow the cast or splint care instructions your doctor gives you. If you have a removable fiberglass walking cast or a splint, do not take it off unless your doctor tells you to. · Keep your cast or splint dry. If you have a removable fiberglass walking cast or a splint, ask your doctor if it is okay to remove it to bathe. Your doctor may want you to keep it on as much as possible. · If you are told to keep your cast or splint on, tape a sheet of plastic to cover it when you bathe. Water under the cast or splint can cause your skin to itch and hurt. · Never cut your cast or stick anything down inside it to scratch an itch on your leg. When should you call for help? Call 911 anytime you think you may need emergency care. For example, call if: 
  · You have symptoms of a blood clot in your lung (called a pulmonary embolism). These may include: 
? Sudden chest pain. ? Trouble breathing. ? Coughing up blood.  
  · You passed out (lost consciousness).  
 Call your doctor now or seek immediate medical care if: 
  · You have problems with your cast or splint. For example: ? The skin under the cast or splint is burning or stinging. ? The cast or splint feels too tight. ? There is a lot of swelling near the cast or splint. (Some swelling is normal.) ? You have a new fever. ? There is drainage or a bad smell coming from the cast or splint.  
  · You have increased or severe pain.  
  · You have tingling, weakness, or numbness in your foot and toes.  
  · You cannot move your toes.  
  · Your foot turns cold or changes color.  
 Watch closely for changes in your health, and be sure to contact your doctor if: 
  · The pain does not get better day by day.  
  · You do not get better as expected. Where can you learn more? Go to http://rafi-chelsy.info/. Enter Y870 in the search box to learn more about \"Fifth Metatarsal Oneal Fracture: Care Instructions. \" Current as of: November 29, 2017 Content Version: 11.8 © 7032-8997 [employer]. Care instructions adapted under license by [employer] (which disclaims liability or warranty for this information). If you have questions about a medical condition or this instruction, always ask your healthcare professional. [place] [address] any warranty or liability for your use of this information. ,

## 2022-03-21 ENCOUNTER — HOSPITAL ENCOUNTER (OUTPATIENT)
Dept: CT IMAGING | Age: 57
Discharge: HOME | End: 2022-03-21
Attending: NURSE PRACTITIONER
Payer: COMMERCIAL

## 2022-03-21 ENCOUNTER — TRANSCRIBE ORDER (OUTPATIENT)
Dept: SCHEDULING | Age: 57
End: 2022-03-21

## 2022-03-21 DIAGNOSIS — R10.9 PAIN, ABDOMINAL: ICD-10-CM

## 2022-03-21 DIAGNOSIS — R10.9 PAIN, ABDOMINAL: Primary | ICD-10-CM

## 2022-03-21 PROCEDURE — 74011000636 HC RX REV CODE- 636

## 2022-03-21 PROCEDURE — 74177 CT ABD & PELVIS W/CONTRAST: CPT

## 2022-03-21 RX ADMIN — IOPAMIDOL 100 ML: 612 INJECTION, SOLUTION INTRAVENOUS at 18:41

## 2022-03-21 RX ADMIN — DIATRIZOATE MEGLUMINE AND DIATRIZOATE SODIUM 30 ML: 600; 100 SOLUTION ORAL; RECTAL at 18:41
